# Patient Record
Sex: FEMALE | Race: WHITE
[De-identification: names, ages, dates, MRNs, and addresses within clinical notes are randomized per-mention and may not be internally consistent; named-entity substitution may affect disease eponyms.]

---

## 2022-04-02 ENCOUNTER — HOSPITAL ENCOUNTER (EMERGENCY)
Dept: HOSPITAL 4 - SED | Age: 82
Discharge: ANOTHER HOSPITAL | End: 2022-04-02
Payer: MEDICARE

## 2022-04-02 VITALS — BODY MASS INDEX: 25.61 KG/M2 | HEIGHT: 64 IN | WEIGHT: 150 LBS

## 2022-04-02 VITALS — SYSTOLIC BLOOD PRESSURE: 148 MMHG

## 2022-04-02 VITALS — SYSTOLIC BLOOD PRESSURE: 121 MMHG

## 2022-04-02 DIAGNOSIS — Z20.822: ICD-10-CM

## 2022-04-02 DIAGNOSIS — R51.9: ICD-10-CM

## 2022-04-02 DIAGNOSIS — R53.1: Primary | ICD-10-CM

## 2022-04-02 LAB
ALBUMIN SERPL BCP-MCNC: 2.5 G/DL (ref 3.4–4.8)
ALT SERPL W P-5'-P-CCNC: 16 U/L (ref 12–78)
ANION GAP SERPL CALCULATED.3IONS-SCNC: 13 MMOL/L (ref 5–15)
AST SERPL W P-5'-P-CCNC: 26 U/L (ref 10–37)
BASOPHILS # BLD AUTO: 0.1 K/UL (ref 0–0.2)
BASOPHILS NFR BLD AUTO: 1 % (ref 0–2)
BILIRUB SERPL-MCNC: 0.4 MG/DL (ref 0–1)
BUN SERPL-MCNC: 18 MG/DL (ref 8–21)
CALCIUM SERPL-MCNC: 9.6 MG/DL (ref 8.4–11)
CHLORIDE SERPL-SCNC: 98 MMOL/L (ref 98–107)
CREAT SERPL-MCNC: 1.03 MG/DL (ref 0.55–1.3)
EOSINOPHIL # BLD AUTO: 0.2 K/UL (ref 0–0.4)
EOSINOPHIL NFR BLD AUTO: 3 % (ref 0–4)
ERYTHROCYTE [DISTWIDTH] IN BLOOD BY AUTOMATED COUNT: 12.5 % (ref 9–15)
GFR SERPL CREATININE-BSD FRML MDRD: (no result) ML/MIN (ref 90–?)
GLUCOSE SERPL-MCNC: 108 MG/DL (ref 70–99)
HCT VFR BLD AUTO: 40.6 % (ref 36–48)
HGB BLD-MCNC: 13.7 G/DL (ref 12–16)
INR PPP: 0.9 (ref 0.8–1.2)
LYMPHOCYTES # BLD AUTO: 1.2 K/UL (ref 1–5.5)
LYMPHOCYTES NFR BLD AUTO: 17.3 % (ref 20.5–51.5)
MCH RBC QN AUTO: 30 PG (ref 27–31)
MCHC RBC AUTO-ENTMCNC: 34 % (ref 32–36)
MCV RBC AUTO: 90 FL (ref 79–98)
MONOCYTES # BLD MANUAL: 0.4 K/UL (ref 0–1)
MONOCYTES # BLD MANUAL: 5.6 % (ref 1.7–9.3)
NEUTROPHILS # BLD AUTO: 5.3 K/UL (ref 1.8–7.7)
NEUTROPHILS NFR BLD AUTO: 73.1 % (ref 40–70)
PLATELET # BLD AUTO: 280 K/UL (ref 130–430)
POTASSIUM SERPL-SCNC: 3.5 MMOL/L (ref 3.5–5.1)
PROTHROMBIN TIME: 9.8 SECS (ref 9.5–12.5)
RBC # BLD AUTO: 4.5 MIL/UL (ref 4.2–6.2)
SODIUM SERPLBLD-SCNC: 136 MMOL/L (ref 136–145)
WBC # BLD AUTO: 7.2 K/UL (ref 4.8–10.8)

## 2022-04-02 NOTE — NUR
this writer assess pt earlier she was awake alert and oriented times 3 overall 
appearances fair pt stated she had right pain radiating from her right heart to 
her right arm to her right lower leg but pt was found ambulating with her 
sister to the rest room mary jo well without c/o did noted pt had sl droop to right 
mouth but no slurred speech asked if it was normal no answer from pt but sister 
stated it wasn't normal when writer asked pt was she having chest pain denied 
but did stated she had weakness but her sister stated pt have generalized 
weakness its normal and that she had parkinson enc pt to stay in bed and nurse 
will assist her for her general adl's

## 2022-04-02 NOTE — NUR
pt is aware of her transfer to [facility] r/t her insurance, her sister remains at 
her side condition remains stable with c/o's

## 2022-04-02 NOTE — NUR
pt was transfer to [facility] via [facility] her condition remains stable her 
sister and her son at her side report given to [facility] nurse Adam and 
to [facility] ambulance rn all questions answered and all pt personal belonging given 
to pt

## 2022-12-11 ENCOUNTER — HOSPITAL ENCOUNTER (EMERGENCY)
Dept: HOSPITAL 4 - SED | Age: 82
Discharge: HOME | End: 2022-12-11
Payer: MEDICARE

## 2022-12-11 VITALS — BODY MASS INDEX: 26.06 KG/M2 | WEIGHT: 138 LBS | HEIGHT: 61 IN

## 2022-12-11 VITALS — SYSTOLIC BLOOD PRESSURE: 202 MMHG

## 2022-12-11 VITALS — SYSTOLIC BLOOD PRESSURE: 178 MMHG

## 2022-12-11 DIAGNOSIS — M54.31: Primary | ICD-10-CM

## 2022-12-11 DIAGNOSIS — M54.50: ICD-10-CM

## 2022-12-11 DIAGNOSIS — Z88.2: ICD-10-CM

## 2022-12-11 DIAGNOSIS — I10: ICD-10-CM

## 2022-12-11 DIAGNOSIS — M79.651: ICD-10-CM

## 2022-12-11 DIAGNOSIS — Z79.899: ICD-10-CM

## 2022-12-11 PROCEDURE — 93971 EXTREMITY STUDY: CPT

## 2022-12-11 PROCEDURE — 99284 EMERGENCY DEPT VISIT MOD MDM: CPT

## 2022-12-11 PROCEDURE — 96372 THER/PROPH/DIAG INJ SC/IM: CPT

## 2022-12-11 NOTE — NUR
Patient given written and verbal discharge instructions and verbalizes 
understanding.  ER MD discussed with patient the results and treatment 
provided. Patient in stable condition. ID arm band removed. IV catheter removed 
intact and dressing applied, no active bleeding. Rx of NORCO, MOTRIN given. 
Patient educated on pain management and to follow up with PMD. Pain Scale 0/10 
Opportunity for questions provided and answered. Medication side effect fact 
sheet provided.

## 2022-12-11 NOTE — NUR
[facility] Side rails up.

Report given to Carson GAINES.